# Patient Record
Sex: MALE | Race: OTHER
[De-identification: names, ages, dates, MRNs, and addresses within clinical notes are randomized per-mention and may not be internally consistent; named-entity substitution may affect disease eponyms.]

---

## 2020-10-16 ENCOUNTER — HOSPITAL ENCOUNTER (OUTPATIENT)
Dept: HOSPITAL 72 - SUR | Age: 40
Discharge: HOME | End: 2020-10-16
Payer: COMMERCIAL

## 2020-10-16 VITALS — DIASTOLIC BLOOD PRESSURE: 77 MMHG | SYSTOLIC BLOOD PRESSURE: 130 MMHG

## 2020-10-16 VITALS — SYSTOLIC BLOOD PRESSURE: 125 MMHG | DIASTOLIC BLOOD PRESSURE: 60 MMHG

## 2020-10-16 VITALS — DIASTOLIC BLOOD PRESSURE: 72 MMHG | SYSTOLIC BLOOD PRESSURE: 130 MMHG

## 2020-10-16 VITALS — SYSTOLIC BLOOD PRESSURE: 145 MMHG | DIASTOLIC BLOOD PRESSURE: 81 MMHG

## 2020-10-16 VITALS — DIASTOLIC BLOOD PRESSURE: 73 MMHG | SYSTOLIC BLOOD PRESSURE: 139 MMHG

## 2020-10-16 VITALS — WEIGHT: 255 LBS | BODY MASS INDEX: 33.8 KG/M2 | HEIGHT: 73 IN

## 2020-10-16 VITALS — SYSTOLIC BLOOD PRESSURE: 119 MMHG | DIASTOLIC BLOOD PRESSURE: 60 MMHG

## 2020-10-16 VITALS — DIASTOLIC BLOOD PRESSURE: 82 MMHG | SYSTOLIC BLOOD PRESSURE: 118 MMHG

## 2020-10-16 VITALS — DIASTOLIC BLOOD PRESSURE: 72 MMHG | SYSTOLIC BLOOD PRESSURE: 126 MMHG

## 2020-10-16 VITALS — SYSTOLIC BLOOD PRESSURE: 136 MMHG | DIASTOLIC BLOOD PRESSURE: 78 MMHG

## 2020-10-16 VITALS — DIASTOLIC BLOOD PRESSURE: 60 MMHG | SYSTOLIC BLOOD PRESSURE: 124 MMHG

## 2020-10-16 VITALS — DIASTOLIC BLOOD PRESSURE: 59 MMHG | SYSTOLIC BLOOD PRESSURE: 114 MMHG
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
## 2020-10-16 DIAGNOSIS — K21.9: ICD-10-CM

## 2020-10-16 DIAGNOSIS — S83.281A: ICD-10-CM

## 2020-10-16 DIAGNOSIS — I10: ICD-10-CM

## 2020-10-16 DIAGNOSIS — M19.90: ICD-10-CM
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
## 2020-10-16 DIAGNOSIS — S83.241A: Primary | ICD-10-CM

## 2020-10-16 DIAGNOSIS — M67.261: ICD-10-CM

## 2020-10-16 PROCEDURE — 29876 ARTHRS KNEE SURG SYNVCT MAJ: CPT

## 2020-10-16 PROCEDURE — 94150 VITAL CAPACITY TEST: CPT

## 2020-10-16 PROCEDURE — 94003 VENT MGMT INPAT SUBQ DAY: CPT

## 2020-10-16 PROCEDURE — 29880 ARTHRS KNE SRG MNISECTMY M&L: CPT

## 2020-10-16 PROCEDURE — 29999 UNLISTED PX ARTHROSCOPY: CPT

## 2020-10-16 NOTE — OPERATIVE NOTE - DICTATED
DATE OF OPERATION:  10/16/2020

PREOPERATIVE DIAGNOSIS:  Right knee medial meniscus tear.



POSTOPERATIVE DIAGNOSES:

1. Right knee radial tear of the posterior horn and middle body

meniscus.

2. Grade 3 chondral damage medial femoral condyle.

3. Hypertrophic synovial tissue medial and lateral patellofemoral

compartment.

4. Lateral meniscus tear involving the posterior horn and anterior

body.

5. Grade 4 chondral damage trochlear groove.



PROCEDURES:

1. Right knee arthroscopy and partial medial and lateral meniscectomy.

2. Synovectomy medial, lateral, and patellofemoral compartment.

3. Gentle chondroplasty medial and patellofemoral compartment.



SURGEON:  Shantanu De Guzman MD.



ANESTHESIA:  MAC.



INDICATION FOR PROCEDURE:  The patient is a pleasant gentleman, who has had

progressive right knee pain.  He had an MRI, which showed a meniscal tear

and elected to undergo right knee arthroscopy and medial meniscectomy.

Risks, limitations, expectations, and complications of the procedure were

discussed in detail.  All questions addressed.



DESCRIPTION OF PROCEDURE:  After informed was obtained, the patient was

brought to the operating room.  The patient was placed under general

anesthesia.  Right leg was prepped and draped in a sterile manner.

Time-out was performed.  Inferolateral stab incision was then made.

Trocar introduced into the patellofemoral compartment.  There was

hypertrophic synovial tissue making visualization difficult.  Medial

gutter as well as medial compartment was difficult to visualize.  Medial

working portal was established.  Synovectomy of the medial compartment

_____ and lateral compartment was performed.  Once the synovectomy was

completed, access to medial compartment was possible and there was radial

tear at the junction of the posterior horn and middle body of the

meniscus.  Partial meniscectomy using combination of major and biters

was performed on the stable rim of the meniscus.



There was grade _____ chondral damage and 2 x 2 mm on the posterior

femoral condyle.  ACL was probed, noted to be intact.  Lateral compartment

was entered.  There was a small tear of the posterior horn lateral

meniscus as well as there was fraying and tearing of the posterior aspect

of the anterior body of the lateral meniscus.  A partial meniscectomy of

the posterior horn and lateral meniscus was performed.  There was some

grade 2 chondral damage in the tibial plateau.  Camera was then placed in

the patellofemoral compartment.  Once the synovectomy was completed, there

was evidence of grade 3, grade 4 chondral damage in trochlear groove with

adjacent grade 2 chondral damage in the patellofemoral compartment.  At

this point, the instruments were removed.  Portal sites were closed using

3-0 Monocryl sutures.  Steri-Strips and a sterile dressing were applied.



ESTIMATED BLOOD LOSS:  None.



COMPLICATIONS:  None.



SPECIMENS:  None.



IMPLANTS:  None.









  ______________________________________________

  Shantanu De Guzman M.D.





DR:  ANTOINETTE

D:  10/16/2020 08:41

T:  10/16/2020 21:21

JOB#:  [id number]

CC:

## 2020-10-16 NOTE — IMMEDIATE POST-OP EVALUATION
Immediate Post-Op Evalulation


Immediate Post-Op Evalulation


Procedure:  R Knee Arthroscopy


Date of Evaluation:  Oct 16, 2020


Time of Evaluation:  09:03


IV Fluids:  900 LR


Blood Products:  0


Estimated Blood Loss:  25


Urinary Output:  0


Blood Pressure Systolic:  114


Blood Pressure Diastolic:  59


Pulse Rate:  78


Respiratory Rate:  16


O2 Sat by Pulse Oximetry:  99


Temperature (Fahrenheit):  98.1


Pain Score (1-10):  2


Nausea:  No


Vomiting:  No


Complications


0


Patient Status:  awake, reacts, patent, none


Hydration Status:  adequate


Dru Grams Ancef IV


Given Within 1 Hr of Incision:  Yes


Time Given:  07:41











Jim Thomson MD                Oct 16, 2020 07:09

## 2020-10-16 NOTE — OPERATIVE NOTE - PDOC
Operative Note


Operative Note


Pre-op Diagnosis:


right knee medial meniscus tear


Procedure:


see op report


Post-op Diagnosis:  same as pre-op plus


Operative Findings:  consistent w/pre-op dx studies


Anesthesia:  MAC


Specimen:  none


Complications:  none


Condition:  stable


Estimated Blood Loss:  none


Implant(s) used?:  No











Shantanu De Guzman MD        Oct 16, 2020 07:32

## 2020-10-16 NOTE — ANETHESIA PREOPERATIVE EVAL
Anesthesia Pre-op PMH/ROS


General


Date of Evaluation:  Oct 16, 2020


Time of Evaluation:  07:19


Anesthesiologist:  Alix


ASA Score:  ASA 3


Mallampati Score


Class I : Soft palate, uvula, fauces, pillars visible


Class II: Soft palate, uvula, fauces visible


Class III: Soft palate, base of uvula visible


Class IV: Only hard plate visible


Mallampati Classification:  Class II


Surgeon:  Gab


Diagnosis:  R Knee Pain


Surgical Procedure:  R Knee Arthroscopy


Anesthesia History:  none


Family History:  no anesthesia problems


Allergies:  


Coded Allergies:  


     No Known Allergies (Unverified , 10/14/20)


Medications:  see eMAR


Patient NPO?:  Yes





Past Medical History


Cardiovascular:  Reports: HTN, arrhythmia - AFib-Cardioverted, other - Pericard

itis


Gastrointestinal/Genitourinary:  Reports: GERD - GI Ulcer


Musculoskeletal/Integumentary:  Reports: OA


PSxH Narrative:


Dental Implant





Anesthesia Pre-op Phys. Exam


Physician Exam





Last Vital Signs








  Date Time  Temp Pulse Resp B/P (MAP) Pulse Ox O2 Delivery O2 Flow Rate FiO2


 


10/16/20 05:37      Room Air  


 


10/16/20 05:37 97.6 75 18 126/72 96   








Constitutional:  NAD


Neurologic:  CN 2-12 intact


Cardiovascular:  RRR


Respiratory:  CTA


Gastrointestinal:  S/NT/ND





Airway Exam


Mallampati Score:  Class II


MO:  full


ROM:  limited


Teeth:  missing, intact





Anesthesia Pre-op A/P


Risk Assessment & Plan


Assessment:


ASA 3


Plan:


GA, SED


Status Change Before Surgery:  No





Pre-Antibiotics


Dru Grams Ancef IV


Given Within 1 Hr of Incision:  Yes


Time Given:  07:41











Jim Thomson MD                Oct 16, 2020 07:08

## 2020-10-16 NOTE — PRE-PROCEDURE NOTE/ATTESTATION
Pre-Procedure Note/Attestation


Complete Prior to Procedure


Planned Procedure:  right


Procedure Narrative:


knee arthroscopy, medial menisectomy





Indications for Procedure


Pre-Operative Diagnosis:


right knee medial meniscus tear





Attestation


I attest that I discussed the nature of the procedure; its benefits; risks and 

complications; and alternatives (and the risks and benefits of such alterna

tives), prior to the procedure, with the patient (or the patient's legal 

representative).





I attest that, if there was a reasonable possibility of needing a blood 

transfusion, the patient (or the patient's legal representative) was given the 

[facility] standardized written summary, pursuant 

to the [employer] Blood Safety Act ([place] Health and Safety Code # 1645, as 

amended).





I attest that I re-evaluated the patient just prior to the surgery and that 

there has been no change in the patient's H&P, except as documented below:











Shantanu De Guzman MD        Oct 16, 2020 07:31

## 2020-10-16 NOTE — 48 HOUR POST ANESTHESIA EVAL
Post Anesthesia Evaluation


Procedure:  R Knee Arthroscopy


Date of Evaluation:  Oct 16, 2020


Time of Evaluation:  11:12


Blood Pressure Systolic:  138


0:  79


Pulse Rate:  63


Respiratory Rate:  18


Temperature (Fahrenheit):  98.4


O2 Sat by Pulse Oximetry:  99


Airway:  patent


Nausea:  No


Vomiting:  No


Pain Intensity:  2


Hydration Status:  adequate


Cardiopulmonary Status:


Stable


Mental Status/LOC:  patient returned to baseline


Follow-up Care/Observations:


0


Post-Anesthesia Complications:


0


Follow-up care needed:  ready to discharge











Jim Thomson MD                Oct 16, 2020 07:10